# Patient Record
Sex: FEMALE | Race: WHITE | HISPANIC OR LATINO
[De-identification: names, ages, dates, MRNs, and addresses within clinical notes are randomized per-mention and may not be internally consistent; named-entity substitution may affect disease eponyms.]

---

## 2020-07-21 ENCOUNTER — APPOINTMENT (OUTPATIENT)
Dept: ENDOCRINOLOGY | Facility: CLINIC | Age: 70
End: 2020-07-21
Payer: COMMERCIAL

## 2020-07-21 VITALS
BODY MASS INDEX: 32.99 KG/M2 | HEIGHT: 65 IN | HEART RATE: 64 BPM | SYSTOLIC BLOOD PRESSURE: 150 MMHG | WEIGHT: 198 LBS | DIASTOLIC BLOOD PRESSURE: 89 MMHG

## 2020-07-21 PROBLEM — Z00.00 ENCOUNTER FOR PREVENTIVE HEALTH EXAMINATION: Status: ACTIVE | Noted: 2020-07-21

## 2020-07-21 PROCEDURE — 36415 COLL VENOUS BLD VENIPUNCTURE: CPT

## 2020-07-21 PROCEDURE — 99205 OFFICE O/P NEW HI 60 MIN: CPT | Mod: 25

## 2020-07-22 NOTE — ASSESSMENT
[FreeTextEntry1] : 1. Thyroid nodules. Asymptomatic .\par Surgically treated in 1996 in [facility]\par PE: thyroid with nodularities , order thyroid ultrasound\par \par 2.Hyperthyroidism treated with CARR  ablation in 2018 ( as per patient)\par Sending TFT, and call pat with results\par \par

## 2020-07-22 NOTE — ADDENDUM
[FreeTextEntry1] : I, Cooper Rose, acted solely as a scribe for Dr. Tucker Gross on this date. 07/21/2020.

## 2020-07-22 NOTE — HISTORY OF PRESENT ILLNESS
[FreeTextEntry1] : 71 y/o F pt, with Hx of thyroid surgery to remove a large nodule (in [facility] presents today to establish endocrine care with me.\par Other PMHx: HLD, HTN, obesity, varicose veins in LE\par PSHx: knee replacement (2011) \par FHx: DM (uncle), thyroid disease (sister) \par SHx: no tobacco/etOH use; works as an  and has 3 children\par Referral Note from MD "hx of thyroid nodules removed in 1996 and CARR ablation in 2018?, has elevation of TSH."\par \par Pt states she had thyroid surgery to remove a large nodule (in [facility] Pt notes after surgery she did not have any complaints and was not given any specific treatments. In 2018, she started to become symptomatic with c/o leg swelling, tiredness, palpitations, and weight loss of ~15 - 20lbs. She states she went for endo evaluation, who ordered Thyroid US and pt notes she received CARR ablation in 2018. Pt states she felt better after CARR ablation. Pt reports in 2019 a Thyroid US was done. \par \par 7/21/20 \par Today pt presents feeling well with c/o mild hair thinning.  \par Denies palpitations, heat intolerance, and weight loss. \par \par Current Medications: None (pt reports she has never been on any thyroid medications)

## 2020-07-22 NOTE — REVIEW OF SYSTEMS
[As Noted in HPI] : as noted in HPI [Negative] : Endocrine [Heat Intolerance] : no heat intolerance [Palpitations] : no palpitations [Recent Weight Loss (___ Lbs)] : no recent weight loss [de-identified] : mild hair thinning

## 2020-07-22 NOTE — PHYSICAL EXAM
[Alert] : alert [Normal Sclera/Conjunctiva] : normal sclera/conjunctiva [Normal Outer Ear/Nose] : the ears and nose were normal in appearance [Clear to Auscultation] : lungs were clear to auscultation bilaterally [Normal S1, S2] : normal S1 and S2 [Normal Rate] : heart rate was normal [Pedal Pulses Normal] : the pedal pulses are present [Normal Bowel Sounds] : normal bowel sounds [Spine Straight] : spine straight [Normal Gait] : normal gait [No Rash] : no rash [Oriented x3] : oriented to person, place, and time [Well Healed Scar] : well healed scar [No Tremors] : no tremors [Normal Reflexes] : deep tendon reflexes were 2+ and symmetric [de-identified] : L side with mild nodularities  [de-identified] : no hand tremors  [de-identified] : varicose veins in lower extremities

## 2020-07-22 NOTE — END OF VISIT
[FreeTextEntry3] : All medical record entries made by the Scribe were at my, Dr. Tukcer Gross, direction and personally dictated by me on 07/21/2020. I have reviewed the chart and agree that the record accurately reflects my personal performance of the history, physical exam, assessment and plan. I have also personally directed, reviewed and agreed with the chart.  [Time Spent: ___ minutes] : I have spent [unfilled] minutes of time on the encounter. [>50% of the face to face encounter time was spent on counseling and/or coordination of care for ___] : Greater than 50% of the face to face encounter time was spent on counseling and/or coordination of care for [unfilled]

## 2020-07-22 NOTE — REASON FOR VISIT
[Initial Evaluation] : an initial evaluation [Thyroid nodule/ MNG] : thyroid nodule/ MNG [Hyperthyroidism] : hyperthyroidism [FreeTextEntry2] : Dr. Savanah He

## 2020-07-22 NOTE — CONSULT LETTER
[Consult Letter:] : I had the pleasure of evaluating your patient, [unfilled]. [Dear  ___] : Dear  [unfilled], [Consult Closing:] : Thank you very much for allowing me to participate in the care of this patient.  If you have any questions, please do not hesitate to contact me. [FreeTextEntry3] : Renato Ceballos

## 2020-08-20 ENCOUNTER — APPOINTMENT (OUTPATIENT)
Dept: ENDOCRINOLOGY | Facility: CLINIC | Age: 70
End: 2020-08-20
Payer: COMMERCIAL

## 2020-08-20 VITALS
DIASTOLIC BLOOD PRESSURE: 87 MMHG | SYSTOLIC BLOOD PRESSURE: 137 MMHG | BODY MASS INDEX: 33.12 KG/M2 | HEART RATE: 70 BPM | WEIGHT: 199 LBS

## 2020-08-20 PROCEDURE — 99215 OFFICE O/P EST HI 40 MIN: CPT

## 2020-08-20 NOTE — REVIEW OF SYSTEMS
[Negative] : Heme/Lymph [Recent Weight Gain (___ Lbs)] : no recent weight gain [Recent Weight Loss (___ Lbs)] : no recent weight loss [Palpitations] : no palpitations [Heat Intolerance] : no heat intolerance

## 2020-08-20 NOTE — HISTORY OF PRESENT ILLNESS
[FreeTextEntry1] : 69 y/o F pt, with Hx of thyroid surgery to remove a large nodule (in [facility] and "received CARR ablation in 2018". \par Other PMHx: Osteoporosis, HLD, HTN, obesity, LE varicose veins\par Denies Hx of bone fx and kideny stones. \par PSHx: knee replacement (2011) \par FHx: DM (uncle), thyroid disease (sister) \par SHx: Non smoker. No EtOH use. Works as an  and has 3 children\par \par 7/21/20 \par Pt states she had thyroid surgery to remove a large nodule (in [facility] Pt notes after surgery she did not have any complaints and was not given any specific treatments. In 2018, she started to become symptomatic with c/o leg swelling, tiredness, palpitations, and weight loss of ~15 - 20lbs. She states she went for endo evaluation, who ordered Thyroid US and pt notes she received CARR ablation in 2018. Pt states she felt better after CARR ablation. Pt reports in 2019 a Thyroid US was done. \par Today pt presents feeling well with c/o mild hair thinning. Denies palpitations, heat intolerance, and weight loss. \par \par 08/20/2020\par Pt presents today for thyroid f/u, feeling well with no acute complaints. She was reportedly informed ~1 year ago that her calcium level was high. \par Denies Hx of bone fx and kidney stones. \par \par Current Medications: None (pt reports she has never been on any thyroid medications) \par \par Labs:\par - 8/7/20 Thyroid US compared to study on 8/14/17: Enlarged R lobe with 4 nodules: Nodule 1: Stable anterior R upper pole well-circumscribed echogenic nodule measuring 0.9 x 0.5 x 0.6 cm with no calcifications. Nodule 2: Stable R upper pole well-circumscribed heterogenous nodules measuring 1.4 x 1.2 x 1.1 cm with thick linear calcifications. Nodule 3: Stable R lower pole thick-walled cystic nodule measuring 0.7 x 0.6 x 0.7 cm. Nodule 4: R lower pole well-circumscribed heterogenous nodule measuring 2 x 1.7 x 1.7 cm, not previously demonstrated. Small L lobe with hypervascularity and heterogenous parenchyma containing 3 nodules. Nodule 1: Stable L upper pole well-circumscribed cystic soft tissue nodule measuring 0.8 x 0.8 x 0.7 cm with questionable foci of calcification. Nodule 2: Stable L midpole well-circumscribed heterogenous nodule measuring 0.5 x 0.5 x 0.5 cm with no calcification. Nodule 3: Posterior to the L midpole ovoid hypoechoic soft tissue nodule measuring 2.1 x 1.1 x 1.1 cm with mild peripheral vascularity but no significant change. Possibly an enlarged parathyroid gland/adenoma.  \par - 7/22/20: TSH 5.22 (H), TSH Rab neg, Free T4 1.3, Free T3 2.77, Ca 11.8 (H)

## 2020-08-20 NOTE — PHYSICAL EXAM
[Alert] : alert [Normal Outer Ear/Nose] : the ears and nose were normal in appearance [Normal Sclera/Conjunctiva] : normal sclera/conjunctiva [Well Healed Scar] : well healed scar [Clear to Auscultation] : lungs were clear to auscultation bilaterally [Normal S1, S2] : normal S1 and S2 [Pedal Pulses Normal] : the pedal pulses are present [Normal Rate] : heart rate was normal [Normal Bowel Sounds] : normal bowel sounds [Spine Straight] : spine straight [Normal Gait] : normal gait [No Rash] : no rash [Normal Reflexes] : deep tendon reflexes were 2+ and symmetric [Oriented x3] : oriented to person, place, and time [No Tremors] : no tremors [de-identified] : L side with mild nodularities  [de-identified] : varicose veins in lower extremities  [de-identified] : no hand tremors

## 2020-08-20 NOTE — ADDENDUM
[FreeTextEntry1] : I, Jerry Wright, acted solely as a scribe for Dr. Tucker Gross on this date. 08/20/2020.

## 2020-08-20 NOTE — END OF VISIT
[FreeTextEntry3] : All medical record entries made by the Scribe were at my, Dr. Tucker Gross, direction and personally dictated by me on 08/20/2020. I have reviewed the chart and agree that the record accurately reflects my personal performance of the history, physical exam, assessment and plan. I have also personally directed, reviewed and agreed with the chart.  [Time Spent: ___ minutes] : I have spent [unfilled] minutes of time on the encounter. [>50% of the face to face encounter time was spent on counseling and/or coordination of care for ___] : Greater than 50% of the face to face encounter time was spent on counseling and/or coordination of care for [unfilled]

## 2020-08-20 NOTE — ASSESSMENT
[FreeTextEntry1] : 71 y/o F pt with: \par \par 1. Thyroid surgery in 1996 for thyroid nodule removal and she received CARR ablation in 2018: \par Pt is clinically euthyroid. TSH Receptor ab is negative. \par Thyroid US in 8/2020 in comparison to previous study on 8/2017 reveals enlarged R lobe and small L lobe. Posterior to the L midpole ovoid hypoechoic soft tissue nodule measuring 2.1 cm  possibly an enlarged parathyroid adenoma. New R lower pole 2 cm nodule. Radiologist recommend thyroid US in a year. \par \par 2. Hypercalcemia:\par Ca 11.8 on 7/2020 (pt admits hypercalcemia dx for past 1-2 years). \par Thyroid and Parathyroid US on 8/2020 reveals posterior to the L midpole ovoid hypoechoic soft tissue nodule measuring 2.1 cm possibly an enlarged parathyroid adenoma.   \par These US findings and elevation of Ca indicate probable dx of primary hyperparathyroidism. \par Will send blood test and BMD to confirm dx; will call pt with results. \par \par Return in: 3 months

## 2020-08-21 LAB
24R-OH-CALCIDIOL SERPL-MCNC: 81.1 PG/ML
25(OH)D3 SERPL-MCNC: 21.2 NG/ML
ALBUMIN SERPL ELPH-MCNC: 4.4 G/DL
ALBUMIN SERPL ELPH-MCNC: 4.8 G/DL
ALP BLD-CCNC: 100 U/L
ALP BLD-CCNC: 98 U/L
ALT SERPL-CCNC: 14 U/L
ALT SERPL-CCNC: 18 U/L
ANION GAP SERPL CALC-SCNC: 11 MMOL/L
ANION GAP SERPL CALC-SCNC: 13 MMOL/L
AST SERPL-CCNC: 16 U/L
AST SERPL-CCNC: 20 U/L
BILIRUB SERPL-MCNC: 0.4 MG/DL
BILIRUB SERPL-MCNC: 0.7 MG/DL
BUN SERPL-MCNC: 20 MG/DL
BUN SERPL-MCNC: 21 MG/DL
CALCIUM SERPL-MCNC: 11.1 MG/DL
CALCIUM SERPL-MCNC: 11.1 MG/DL
CALCIUM SERPL-MCNC: 11.8 MG/DL
CHLORIDE SERPL-SCNC: 102 MMOL/L
CHLORIDE SERPL-SCNC: 103 MMOL/L
CO2 SERPL-SCNC: 26 MMOL/L
CO2 SERPL-SCNC: 28 MMOL/L
CREAT SERPL-MCNC: 0.76 MG/DL
CREAT SERPL-MCNC: 0.82 MG/DL
GLUCOSE SERPL-MCNC: 60 MG/DL
GLUCOSE SERPL-MCNC: 89 MG/DL
PARATHYROID HORMONE INTACT: 159 PG/ML
POTASSIUM SERPL-SCNC: 4.3 MMOL/L
POTASSIUM SERPL-SCNC: 4.9 MMOL/L
PROT SERPL-MCNC: 6.5 G/DL
PROT SERPL-MCNC: 7.2 G/DL
SODIUM SERPL-SCNC: 141 MMOL/L
SODIUM SERPL-SCNC: 142 MMOL/L
T3FREE SERPL-MCNC: 2.77 PG/ML
T4 FREE SERPL-MCNC: 1.3 NG/DL
TSH RECEPTOR AB: <1.1 IU/L
TSH SERPL-ACNC: 5.22 UIU/ML

## 2020-11-23 ENCOUNTER — APPOINTMENT (OUTPATIENT)
Dept: ENDOCRINOLOGY | Facility: CLINIC | Age: 70
End: 2020-11-23
Payer: COMMERCIAL

## 2020-11-23 VITALS
WEIGHT: 202 LBS | DIASTOLIC BLOOD PRESSURE: 88 MMHG | BODY MASS INDEX: 33.61 KG/M2 | SYSTOLIC BLOOD PRESSURE: 136 MMHG | HEART RATE: 71 BPM

## 2020-11-23 PROCEDURE — 99215 OFFICE O/P EST HI 40 MIN: CPT

## 2020-11-23 NOTE — REVIEW OF SYSTEMS
[Negative] : Endocrine [As Noted in HPI] : as noted in HPI [Recent Weight Gain (___ Lbs)] : recent weight gain: [unfilled] lbs

## 2020-11-24 NOTE — ADDENDUM
[FreeTextEntry1] : I, Cooper Rose, acted solely as a scribe for Dr. Tucker Gross on this date. 11/23/2020.

## 2020-11-24 NOTE — PHYSICAL EXAM
[Alert] : alert [Normal Sclera/Conjunctiva] : normal sclera/conjunctiva [Normal Outer Ear/Nose] : the ears and nose were normal in appearance [Well Healed Scar] : well healed scar [Clear to Auscultation] : lungs were clear to auscultation bilaterally [Normal S1, S2] : normal S1 and S2 [Normal Rate] : heart rate was normal [Pedal Pulses Normal] : the pedal pulses are present [Normal Bowel Sounds] : normal bowel sounds [Spine Straight] : spine straight [Normal Gait] : normal gait [No Rash] : no rash [Normal Reflexes] : deep tendon reflexes were 2+ and symmetric [No Tremors] : no tremors [Oriented x3] : oriented to person, place, and time [de-identified] : L side with mild nodularities  [de-identified] : varicose veins in lower extremities  [de-identified] : no hand tremors

## 2020-11-24 NOTE — HISTORY OF PRESENT ILLNESS
[FreeTextEntry1] : 71 y/o F pt, with Hx of thyroid surgery to remove a large nodule (in [facility] and "received CARR ablation in 2018". \par Other PMHx: Osteoporosis, HLD, HTN, obesity, LE varicose veins\par Denies Hx of bone fx and kidney stones. \par PSHx: knee replacement (2011) \par FHx: DM (uncle), thyroid disease (sister) \par SHx: Non smoker. No EtOH use. Works as an  and has 3 children\par \par 7/21/20 \par Pt states she had thyroid surgery to remove a large nodule (in [facility] Pt notes after surgery she did not have any complaints and was not given any specific treatments. In 2018, she started to become symptomatic with c/o leg swelling, tiredness, palpitations, and weight loss of ~15 - 20lbs. She states she went for endo evaluation, who ordered Thyroid US and pt notes she received CARR ablation in 2018. Pt states she felt better after CARR ablation. Pt reports in 2019 a Thyroid US was done. \par Today pt presents feeling well with c/o mild hair thinning. Denies palpitations, heat intolerance, and weight loss. \par \par 08/20/2020\par Pt presents today for thyroid f/u, feeling well with no acute complaints. She was reportedly informed ~1 year ago that her calcium level was high. \par Denies Hx of bone fx and kidney stones. \par \par 11/23/20 \par Her labs from initial visit: TSH 5.2, TSH rab negative, hypercalcemia, and increase iPTH (in 7/2020). Her thyroid US from 8/2020 shows enlarged R thyroid lobe and small L thyroid lobe. There is heterogenous nodule at R Lower Pole which measures 2 x 1.7 x 1.7 cm with no calcifications (this nodule is new). In the L lobe there is soft tissue nodule measures 2.1cm possible enlarged parathyroid gland.    \par \par Today pt presents for endocrine f/u, feeling well. Pt brought in BMD from 8/2019.\par Pt gained 3lbs since 8/2020. \par \par Current Medications: None (pt reports she has never been on any thyroid medications) \par \par Labs:\par - 8/20/20 iPTH 159, Ca 11.1, Vit D 25- OH 21.2, Vit D 1 - 25- OH 81.1, s. creat 0.76, \par - 8/7/20 Thyroid US compared to study on 8/14/17: Enlarged R lobe with 4 nodules: Nodule 1: Stable anterior R upper pole well-circumscribed echogenic nodule measuring 0.9 x 0.5 x 0.6 cm with no calcifications. Nodule 2: Stable R upper pole well-circumscribed heterogenous nodules measuring 1.4 x 1.2 x 1.1 cm with thick linear calcifications. Nodule 3: Stable R lower pole thick-walled cystic nodule measuring 0.7 x 0.6 x 0.7 cm. Nodule 4: R lower pole well-circumscribed heterogenous nodule measuring 2 x 1.7 x 1.7 cm, not previously demonstrated. Small L lobe with hypervascularity and heterogenous parenchyma containing 3 nodules. Nodule 1: Stable L upper pole well-circumscribed cystic soft tissue nodule measuring 0.8 x 0.8 x 0.7 cm with questionable foci of calcification. Nodule 2: Stable L midpole well-circumscribed heterogenous nodule measuring 0.5 x 0.5 x 0.5 cm with no calcification. Nodule 3: Posterior to the L midpole ovoid hypoechoic soft tissue nodule measuring 2.1 x 1.1 x 1.1 cm with mild peripheral vascularity but no significant change. Possibly an enlarged parathyroid gland/adenoma.  \par - 7/22/20: TSH 5.22 (H), TSH Rab neg, Free T4 1.3, Free T3 2.77, Ca 11.8 (H)\par - 8/29/19 BMD: Lumber Spine L1- L3 T- Score: -2.4, L Femoral Neck T- Score: -1.3, L Total Hip T- Score: -1.5, L Radius 1/3 Site T- Score: -0.6. \par Impression: Pt has low bone mass (osteopenia).

## 2020-11-24 NOTE — END OF VISIT
[FreeTextEntry3] : All medical record entries made by the Scribe were at my, Dr. Tucker Gross, direction and personally dictated by me on 11/23/2020. I have reviewed the chart and agree that the record accurately reflects my personal performance of the history, physical exam, assessment and plan. I have also personally directed, reviewed and agreed with the chart.  [Time Spent: ___ minutes] : I have spent [unfilled] minutes of time on the encounter. [>50% of the face to face encounter time was spent on counseling and/or coordination of care for ___] : Greater than 50% of the face to face encounter time was spent on counseling and/or coordination of care for [unfilled]

## 2020-12-10 ENCOUNTER — APPOINTMENT (OUTPATIENT)
Dept: OTOLARYNGOLOGY | Facility: CLINIC | Age: 70
End: 2020-12-10
Payer: COMMERCIAL

## 2020-12-10 VITALS
HEART RATE: 62 BPM | HEIGHT: 63 IN | SYSTOLIC BLOOD PRESSURE: 173 MMHG | TEMPERATURE: 97.7 F | BODY MASS INDEX: 35.79 KG/M2 | OXYGEN SATURATION: 99 % | DIASTOLIC BLOOD PRESSURE: 115 MMHG | WEIGHT: 202 LBS

## 2020-12-10 DIAGNOSIS — Z86.79 PERSONAL HISTORY OF OTHER DISEASES OF THE CIRCULATORY SYSTEM: ICD-10-CM

## 2020-12-10 DIAGNOSIS — Z78.9 OTHER SPECIFIED HEALTH STATUS: ICD-10-CM

## 2020-12-10 DIAGNOSIS — Z82.49 FAMILY HISTORY OF ISCHEMIC HEART DISEASE AND OTHER DISEASES OF THE CIRCULATORY SYSTEM: ICD-10-CM

## 2020-12-10 DIAGNOSIS — Z86.39 PERSONAL HISTORY OF OTHER ENDOCRINE, NUTRITIONAL AND METABOLIC DISEASE: ICD-10-CM

## 2020-12-10 DIAGNOSIS — E83.52 HYPERCALCEMIA: ICD-10-CM

## 2020-12-10 DIAGNOSIS — M81.0 AGE-RELATED OSTEOPOROSIS W/OUT CURRENT PATHOLOGICAL FRACTURE: ICD-10-CM

## 2020-12-10 PROCEDURE — 99205 OFFICE O/P NEW HI 60 MIN: CPT | Mod: 25

## 2020-12-10 PROCEDURE — 31575 DIAGNOSTIC LARYNGOSCOPY: CPT

## 2020-12-10 PROCEDURE — 76536 US EXAM OF HEAD AND NECK: CPT

## 2020-12-10 PROCEDURE — 99072 ADDL SUPL MATRL&STAF TM PHE: CPT

## 2020-12-10 RX ORDER — HYDROCHLOROTHIAZIDE 12.5 MG/1
12.5 TABLET ORAL
Refills: 0 | Status: ACTIVE | COMMUNITY

## 2020-12-10 RX ORDER — OLMESARTAN MEDOXOMIL 40 MG/1
40 TABLET, FILM COATED ORAL
Refills: 0 | Status: ACTIVE | COMMUNITY

## 2020-12-10 NOTE — PROCEDURE
[Image(s) Captured] : image(s) captured and filed [Unable to Cooperate with Mirror] : patient unable to cooperate with mirror [Gag Reflex] : gag reflex preventing mirror examination [Topical Lidocaine] : topical lidocaine [Oxymetazoline HCl] : oxymetazoline HCl [Flexible Endoscope] : examined with the flexible endoscope [Serial Number: ___] : Serial Number: [unfilled] [Hoarseness] : hoarseness not clearly evaluated by indirect laryngoscopy [FreeTextEntry3] : \par [facility]\par PARATHYROID ULTRASOUND REPORT\par \par NAME: PORTER ALEXANDRE .Libra..           MR# [id number]	              : 1950.....	         DATE: 12/10/2020\par \par HISTORY/ INDICATIONS: A 70-year-old female with well documented primary hyperparathyroidism, multiple thyroid nodules, hypercalcemia and osteoporosis to rule out parathyroid disease and actionable thyroid nodules.  \par \par COMPARISON: None\par \par PROCEDURE: Physician performed high-resolution ultrasound gray scale imaging and color Doppler supplementation of the thyroid gland and neck was obtained in the longitudinal and transverse planes using a 13 MHz linear transducer with image capture.  All measurements are in centimeters (longitudinal x AP x transverse).  \par \par FINDINGS: Overall the thyroid gland is heterogeneous in echotexture with mild thyromegaly involving the right lobe and a small residual left lobe.  The thyroid gland is multinodular with several calcified nodules.  The nodules and thyroid gland were not specifically imaged as patient had prior recent thyroid ultrasound and this study was focused on parathyroid localization.\par \par PARATHYROID GLANDS: Multi-gland parathyroid disease is highly suspected. There is a hypoechoic smoothly marginated oblong structure abutting the superior left thyroid lobe upper pole with a well-defined vascular pedicle and a hyperechoic line of separation that measures 1.95 x 0.90 x 1.07 CM.  A second a large parathyroid glands likely inferior to the right lower pole of the thyroid gland that is smoothly marginated, hypoechoic, with an echogenic line of separation and a polar vessel that measures 1.48 x 0.95 x 1.6 CM.  No other enlarged parathyroid glands are easily identified.\par \par IMPRESSION: A 70-year-old female with primary hyperparathyroidism and 2 hypoechoic structures located and the left upper and right lower thyroid poles suspicious for multi-gland parathyroid hyperplasia.\par \par RECOMMENDATIONS: A 4-D CT scan will be obtained for further confirmation prior to subtotal parathyroidectomy. \par \par Electronically signed by Prabhakar Corley MD on 12/10/2020, 11:00 AM\par \par [facility]: [address] [place] [address], [address], [place]  [address]\par [phone] (voice), [phone] (fax) [de-identified] : The nasal septum is minimally deviated to the left with a spur. There are no masses or polyps and the nasal mucosa and secretions are normal. The choanae and posterior nasopharynx are normal without masses or drainage. The Eustachian tube orifices appear patent. The pharynx, including the posterior and lateral pharyngeal walls, the vallecula and base of tongue are normal without ulcerations, lesions or masses. The hypopharynx including the pyriform sinuses open well without pooling of secretions, mucosal lesions or masses. The supraglottic larynx including the epiglottis, petiole, arytenoids, glossoepiglottic, aryepiglottic and pharyngoepiglottic folds are normal without mucosal lesions, ulcerations or masses. The glottis reveals normal false vocal folds. The true vocal folds are glistening white, tense and of equal length, without paralysis, having symmetric mobility on adduction and abduction. There are no mucosal lesions, nodules, cysts, erythroplasia or leukoplakia. The posterior cricoid area has healthy pink mucosa in the interarytenoid area and esophageal inlet. There is moderate thickening/edema of the interarytenoid mucosa suggestive of posterior laryngitis from laryngopharyngeal acid reflux disease. The trachea is clear without narrowing in the immediate subglottic region, without deviation or lesions. \par  [de-identified] : preoperative assessment

## 2020-12-10 NOTE — CONSULT LETTER
[Dear  ___] : Dear  [unfilled], [Consult Letter:] : I had the pleasure of evaluating your patient, [unfilled]. [Please see my note below.] : Please see my note below. [Consult Closing:] : Thank you very much for allowing me to participate in the care of this patient.  If you have any questions, please do not hesitate to contact me. [Sincerely,] : Sincerely, [FreeTextEntry3] : \par Prabhakar Corley M.D., FACS, ECNU\par Director [facility]\par [facility] [place] [facility]\par Certified in Thyroid/Parathyroid/Neck Ultrasound, ECNU/ AIUM\par \par , Department of Otolaryngology\par [employer] [employer] at [facility]\par

## 2020-12-10 NOTE — REASON FOR VISIT
[FreeTextEntry2] : a surgical consultation concerning hypercalcemia, primary hyperparathyroidism and osteoporosis. [FreeTextEntry1] : Referred by Tucker Gross MD Endocrinologist,  PCP is Arnoldo He MD [facility], [place]

## 2020-12-10 NOTE — HISTORY OF PRESENT ILLNESS
[de-identified] : Lynn is a 70-year-old female who reports that she was treated with CARR for management of hyperthyroidism in 2018 after a subtotal thyroidectomy performed in [place] in the [facility]. She was first noted to have hypercalcemia  and subsequent w/u is now consistent with primary hyperparathyroidism.  Her last serum calcium/PTH was 11.1/159. In August her calcium was as high as 11.8 mg/dl.  Her GFR is normal at 79. Vitamin D 25-OH total was low at 21.2.  She had subclinical hypothyroidism this summer with a TSH elevated to 5.22 with a normal free T4 at 1.3.  A thyroid ultrasound also this past summer revealed a small left lobe remnant and an enlarged right lobe measuring 5.6 x 2.2 x 1.9 cm.  There are three nodules in the right lobe.  There is an upper pole well circumscribed heterogeneous nodule measuring up to 9 mm without calcifications, another right upper pole well circumscribed heterogeneous nodule measuring up to 1.4 CM with linear calcifications in the right lower pole thick walled cystic nodule measuring 7 mm. A new right lower pole also prescribed heterogeneous nodule measures 2 cm without calcifications.  There is an 8 mm left upper pole cystic nodule measuring 8 mm.  A left mid pole well circumscribed heterogeneous nodule measures 5 mm calcifications. Posterior to the left mid lobe is an ovoid hypoechoic soft tissue nodule measuring 2.1 x 1.1 x 1.1 CM. Other than the new right lower pole 2 CM nodule that does not have any suspicious characteristics for malignancy exam was stable when compared to 2017. She is not currently taking any thyroid hormone replacement. Lynn denies recent shortness of breath, voice changes (although voice is always hoarse), dysphagia, anterior neck pain, neck pressure or mass. There is no family history of thyroid cancer. She denies any known radiation exposures in her youth.  She denies calcium, vitamin D supplements or past use of Lithium Carbonate.  However, she started HCTZ ~ 10 months ago.  She stated that her hypercalcemia was noted prior to taking HCTZ.  There is no family history of nephrolithiasis or renal disease.  There is no history of fragility bone fractures.  Other than intermittent constipation, polyuria/ polydipsia, she denies muscle weakness, fatigue, generalized bone aches, joint pain, depression, memory loss, brain fog, nausea, vomiting, abdominal pain, nephrolithiasis, peptic ulcer disease, pancreatitis or GERD. She denies fever, body aches, cough, cyanosis, chest burning, anosmia or recent known COVID exposures.  All family members at home are well.

## 2020-12-28 ENCOUNTER — OUTPATIENT (OUTPATIENT)
Dept: OUTPATIENT SERVICES | Facility: HOSPITAL | Age: 70
LOS: 1 days | End: 2020-12-28
Payer: COMMERCIAL

## 2020-12-28 PROCEDURE — A9500: CPT

## 2020-12-28 PROCEDURE — 78072 PARATHYRD PLANAR W/SPECT&CT: CPT

## 2020-12-28 PROCEDURE — A9512: CPT

## 2020-12-28 PROCEDURE — 78072 PARATHYRD PLANAR W/SPECT&CT: CPT | Mod: 26

## 2021-01-05 ENCOUNTER — APPOINTMENT (OUTPATIENT)
Dept: OTOLARYNGOLOGY | Facility: CLINIC | Age: 71
End: 2021-01-05
Payer: COMMERCIAL

## 2021-01-05 DIAGNOSIS — Z01.818 ENCOUNTER FOR OTHER PREPROCEDURAL EXAMINATION: ICD-10-CM

## 2021-01-05 PROCEDURE — ZZZZZ: CPT

## 2021-01-06 ENCOUNTER — OUTPATIENT (OUTPATIENT)
Dept: OUTPATIENT SERVICES | Facility: HOSPITAL | Age: 71
LOS: 1 days | End: 2021-01-06
Payer: COMMERCIAL

## 2021-01-06 ENCOUNTER — RESULT REVIEW (OUTPATIENT)
Age: 71
End: 2021-01-06

## 2021-01-06 DIAGNOSIS — D44.0 NEOPLASM OF UNCERTAIN BEHAVIOR OF THYROID GLAND: ICD-10-CM

## 2021-01-06 LAB — SARS-COV-2 N GENE NPH QL NAA+PROBE: NOT DETECTED

## 2021-01-06 PROCEDURE — 88321 CONSLTJ&REPRT SLD PREP ELSWR: CPT

## 2021-01-07 ENCOUNTER — TRANSCRIPTION ENCOUNTER (OUTPATIENT)
Age: 71
End: 2021-01-07

## 2021-01-07 VITALS
HEART RATE: 63 BPM | TEMPERATURE: 98 F | DIASTOLIC BLOOD PRESSURE: 80 MMHG | WEIGHT: 206.79 LBS | SYSTOLIC BLOOD PRESSURE: 162 MMHG | HEIGHT: 64 IN | RESPIRATION RATE: 17 BRPM | OXYGEN SATURATION: 97 %

## 2021-01-07 RX ORDER — NIFEDIPINE 30 MG/1
30 TABLET, EXTENDED RELEASE ORAL
Qty: 90 | Refills: 0 | Status: ACTIVE | COMMUNITY
Start: 2020-12-21

## 2021-01-08 ENCOUNTER — INPATIENT (INPATIENT)
Facility: HOSPITAL | Age: 71
LOS: 0 days | Discharge: HOME | DRG: 627 | End: 2021-01-09
Attending: SPECIALIST | Admitting: SPECIALIST
Payer: COMMERCIAL

## 2021-01-08 ENCOUNTER — RESULT REVIEW (OUTPATIENT)
Age: 71
End: 2021-01-08

## 2021-01-08 ENCOUNTER — APPOINTMENT (OUTPATIENT)
Dept: OTOLARYNGOLOGY | Facility: HOSPITAL | Age: 71
End: 2021-01-08

## 2021-01-08 DIAGNOSIS — Z41.9 ENCOUNTER FOR PROCEDURE FOR PURPOSES OTHER THAN REMEDYING HEALTH STATE, UNSPECIFIED: Chronic | ICD-10-CM

## 2021-01-08 LAB
ALBUMIN SERPL ELPH-MCNC: 4.1 G/DL — SIGNIFICANT CHANGE UP (ref 3.3–5)
ALBUMIN SERPL ELPH-MCNC: 4.1 G/DL — SIGNIFICANT CHANGE UP (ref 3.3–5)
ALP SERPL-CCNC: 103 U/L — SIGNIFICANT CHANGE UP (ref 40–120)
ALT FLD-CCNC: 14 U/L — SIGNIFICANT CHANGE UP (ref 10–45)
ANION GAP SERPL CALC-SCNC: 10 MMOL/L — SIGNIFICANT CHANGE UP (ref 5–17)
ANION GAP SERPL CALC-SCNC: 8 MMOL/L — SIGNIFICANT CHANGE UP (ref 5–17)
AST SERPL-CCNC: 21 U/L — SIGNIFICANT CHANGE UP (ref 10–40)
BILIRUB SERPL-MCNC: 0.6 MG/DL — SIGNIFICANT CHANGE UP (ref 0.2–1.2)
BUN SERPL-MCNC: 15 MG/DL — SIGNIFICANT CHANGE UP (ref 7–23)
BUN SERPL-MCNC: 16 MG/DL — SIGNIFICANT CHANGE UP (ref 7–23)
CA-I BLD-SCNC: 1.34 MMOL/L — HIGH (ref 1.12–1.3)
CALCIUM SERPL-MCNC: 10.1 MG/DL — SIGNIFICANT CHANGE UP (ref 8.4–10.5)
CALCIUM SERPL-MCNC: 10.1 MG/DL — SIGNIFICANT CHANGE UP (ref 8.4–10.5)
CHLORIDE SERPL-SCNC: 103 MMOL/L — SIGNIFICANT CHANGE UP (ref 96–108)
CHLORIDE SERPL-SCNC: 106 MMOL/L — SIGNIFICANT CHANGE UP (ref 96–108)
CO2 SERPL-SCNC: 26 MMOL/L — SIGNIFICANT CHANGE UP (ref 22–31)
CO2 SERPL-SCNC: 28 MMOL/L — SIGNIFICANT CHANGE UP (ref 22–31)
CREAT SERPL-MCNC: 0.61 MG/DL — SIGNIFICANT CHANGE UP (ref 0.5–1.3)
CREAT SERPL-MCNC: 0.73 MG/DL — SIGNIFICANT CHANGE UP (ref 0.5–1.3)
GLUCOSE SERPL-MCNC: 102 MG/DL — HIGH (ref 70–99)
GLUCOSE SERPL-MCNC: 154 MG/DL — HIGH (ref 70–99)
HCT VFR BLD CALC: 44.3 % — SIGNIFICANT CHANGE UP (ref 34.5–45)
HGB BLD-MCNC: 14.9 G/DL — SIGNIFICANT CHANGE UP (ref 11.5–15.5)
MAGNESIUM SERPL-MCNC: 1.8 MG/DL — SIGNIFICANT CHANGE UP (ref 1.6–2.6)
MAGNESIUM SERPL-MCNC: 1.8 MG/DL — SIGNIFICANT CHANGE UP (ref 1.6–2.6)
MCHC RBC-ENTMCNC: 26.1 PG — LOW (ref 27–34)
MCHC RBC-ENTMCNC: 33.6 GM/DL — SIGNIFICANT CHANGE UP (ref 32–36)
MCV RBC AUTO: 77.7 FL — LOW (ref 80–100)
NON-GYNECOLOGICAL CYTOLOGY STUDY: SIGNIFICANT CHANGE UP
NRBC # BLD: 0 /100 WBCS — SIGNIFICANT CHANGE UP (ref 0–0)
PHOSPHATE SERPL-MCNC: 2.8 MG/DL — SIGNIFICANT CHANGE UP (ref 2.5–4.5)
PHOSPHATE SERPL-MCNC: 3.6 MG/DL — SIGNIFICANT CHANGE UP (ref 2.5–4.5)
PLATELET # BLD AUTO: 153 K/UL — SIGNIFICANT CHANGE UP (ref 150–400)
POTASSIUM SERPL-MCNC: 4.4 MMOL/L — SIGNIFICANT CHANGE UP (ref 3.5–5.3)
POTASSIUM SERPL-MCNC: 4.8 MMOL/L — SIGNIFICANT CHANGE UP (ref 3.5–5.3)
POTASSIUM SERPL-SCNC: 4.4 MMOL/L — SIGNIFICANT CHANGE UP (ref 3.5–5.3)
POTASSIUM SERPL-SCNC: 4.8 MMOL/L — SIGNIFICANT CHANGE UP (ref 3.5–5.3)
PROT SERPL-MCNC: 7.1 G/DL — SIGNIFICANT CHANGE UP (ref 6–8.3)
PTH-INTACT IO % DIF SERPL: 149.4 PG/ML — HIGH (ref 8.5–72.5)
PTH-INTACT IO % DIF SERPL: 206.8 PG/ML — HIGH (ref 8.5–72.5)
PTH-INTACT IO % DIF SERPL: 22 PG/ML — SIGNIFICANT CHANGE UP (ref 8.5–72.5)
PTH-INTACT IO % DIF SERPL: 27.9 PG/ML — SIGNIFICANT CHANGE UP (ref 8.5–72.5)
PTH-INTACT IO % DIF SERPL: 61.8 PG/ML — SIGNIFICANT CHANGE UP (ref 8.5–72.5)
RBC # BLD: 5.7 M/UL — HIGH (ref 3.8–5.2)
RBC # FLD: 14.4 % — SIGNIFICANT CHANGE UP (ref 10.3–14.5)
SODIUM SERPL-SCNC: 140 MMOL/L — SIGNIFICANT CHANGE UP (ref 135–145)
SODIUM SERPL-SCNC: 141 MMOL/L — SIGNIFICANT CHANGE UP (ref 135–145)
WBC # BLD: 11.61 K/UL — HIGH (ref 3.8–10.5)
WBC # FLD AUTO: 11.61 K/UL — HIGH (ref 3.8–10.5)

## 2021-01-08 PROCEDURE — 88331 PATH CONSLTJ SURG 1 BLK 1SPC: CPT | Mod: 26

## 2021-01-08 PROCEDURE — 60502 RE-EXPLORE PARATHYROIDS: CPT | Mod: GC

## 2021-01-08 PROCEDURE — 15004 WOUND PREP F/N/HF/G: CPT

## 2021-01-08 PROCEDURE — 88305 TISSUE EXAM BY PATHOLOGIST: CPT | Mod: 26

## 2021-01-08 RX ORDER — HEPARIN SODIUM 5000 [USP'U]/ML
7500 INJECTION INTRAVENOUS; SUBCUTANEOUS EVERY 8 HOURS
Refills: 0 | Status: DISCONTINUED | OUTPATIENT
Start: 2021-01-08 | End: 2021-01-09

## 2021-01-08 RX ORDER — ACETAMINOPHEN 500 MG
650 TABLET ORAL EVERY 6 HOURS
Refills: 0 | Status: DISCONTINUED | OUTPATIENT
Start: 2021-01-08 | End: 2021-01-09

## 2021-01-08 RX ORDER — SODIUM CHLORIDE 9 MG/ML
1000 INJECTION INTRAMUSCULAR; INTRAVENOUS; SUBCUTANEOUS
Refills: 0 | Status: DISCONTINUED | OUTPATIENT
Start: 2021-01-08 | End: 2021-01-08

## 2021-01-08 RX ORDER — LOSARTAN POTASSIUM 100 MG/1
1 TABLET, FILM COATED ORAL
Qty: 0 | Refills: 0 | DISCHARGE

## 2021-01-08 RX ORDER — HEPARIN SODIUM 5000 [USP'U]/ML
5000 INJECTION INTRAVENOUS; SUBCUTANEOUS EVERY 8 HOURS
Refills: 0 | Status: DISCONTINUED | OUTPATIENT
Start: 2021-01-08 | End: 2021-01-08

## 2021-01-08 RX ORDER — HYDROMORPHONE HYDROCHLORIDE 2 MG/ML
0.5 INJECTION INTRAMUSCULAR; INTRAVENOUS; SUBCUTANEOUS
Refills: 0 | Status: DISCONTINUED | OUTPATIENT
Start: 2021-01-08 | End: 2021-01-09

## 2021-01-08 RX ADMIN — Medication 650 MILLIGRAM(S): at 16:39

## 2021-01-08 RX ADMIN — HEPARIN SODIUM 7500 UNIT(S): 5000 INJECTION INTRAVENOUS; SUBCUTANEOUS at 21:46

## 2021-01-08 RX ADMIN — Medication 650 MILLIGRAM(S): at 21:45

## 2021-01-08 NOTE — PACU DISCHARGE NOTE - COMMENTS
pt aao x3.  VSS.  gauze and tape to neck c/d/i; CHRISTIAN x 1 to ss.  reports slight neck discomfort; denies need for pain meds at this time.  report given to RN on 9 uris.  pt to go to [facility] via stretcher with transport

## 2021-01-08 NOTE — BRIEF OPERATIVE NOTE - OPERATION/FINDINGS
Under GA, supine position, lower neck collar incision was made by revision of the previous scar, dense adhesions noted, dissection continued down to the Platysma muscle that was opened,  flaps raised, we entered between the SCM and the strap muscles avoiding the  midline because of dense adhesions from previous surgery, left thyroid remanent was noted, pushed medially, then 2x1 left upper parathyroid adenoma was found, left RLN and vagus identified, +ve nerve stimulation, blood supply clipped, cut and tied, [facility] was sent at the same time and after 10 minutes, a frozen section of LL parathyroid gland was obtained, hemostasis achieved, drain inserted, fascia closed by 5/0 Vicryl, subcutaneous tissue was closed by 4/0 Vicryl, then subcuticular prolene 5/0 sutures for the skin, dressing

## 2021-01-08 NOTE — PROGRESS NOTE ADULT - SUBJECTIVE AND OBJECTIVE BOX
POST-OPERATIVE NOTE    Procedure: revisional neck exploration, left parathyroidectomy for removal of adenoma     Diagnosis/Indication: parathyroid adenoma     Surgeon: MD Barney    S: Pt has no complains of anterior neck, voice is mildly hoarse but she relates it to the throat being dry. Reported posterior neck pain - muscular tension type of pain. Denies CP, SOB, CARTAGENA, calf tenderness. Pain controlled with medication.    O:  T(C): 35.7 (01-08-21 @ 10:35), Max: 35.7 (01-08-21 @ 10:35)  T(F): 96.3 (01-08-21 @ 10:35), Max: 96.3 (01-08-21 @ 10:35)  HR: 64 (01-08-21 @ 11:15) (63 - 65)  BP: 147/90 (01-08-21 @ 11:15) (140/85 - 153/81)  RR: 17 (01-08-21 @ 11:15) (15 - 20)  SpO2: 100% (01-08-21 @ 11:15) (98% - 100%)  Wt(kg): --    01-08    140  |  106  |  15  ----------------------------<  102<H>  4.4   |  26  |  0.61    Ca    10.1      08 Jan 2021 07:37  Phos  2.8     01-08  Mg     1.8     01-08    TPro  7.1  /  Alb  4.1  /  TBili  0.6  /  DBili  x   /  AST  21  /  ALT  14  /  AlkPhos  103  01-08      Gen: NAD, resting comfortably in bed  C/V: NSR  face: symmetric facial movement  Pulm: Nonlabored breathing, no respiratory distress  Neck: no hematoma or edema, no erythema  Extrem: WWP, no calf edema, SCDs in place

## 2021-01-09 VITALS
HEART RATE: 59 BPM | DIASTOLIC BLOOD PRESSURE: 94 MMHG | RESPIRATION RATE: 17 BRPM | OXYGEN SATURATION: 98 % | SYSTOLIC BLOOD PRESSURE: 160 MMHG | TEMPERATURE: 98 F

## 2021-01-09 LAB
ALBUMIN SERPL ELPH-MCNC: 3.7 G/DL — SIGNIFICANT CHANGE UP (ref 3.3–5)
ALP SERPL-CCNC: 82 U/L — SIGNIFICANT CHANGE UP (ref 40–120)
ALT FLD-CCNC: 11 U/L — SIGNIFICANT CHANGE UP (ref 10–45)
ANION GAP SERPL CALC-SCNC: 11 MMOL/L — SIGNIFICANT CHANGE UP (ref 5–17)
AST SERPL-CCNC: 14 U/L — SIGNIFICANT CHANGE UP (ref 10–40)
BILIRUB SERPL-MCNC: 0.5 MG/DL — SIGNIFICANT CHANGE UP (ref 0.2–1.2)
BUN SERPL-MCNC: 17 MG/DL — SIGNIFICANT CHANGE UP (ref 7–23)
CALCIUM SERPL-MCNC: 8.8 MG/DL — SIGNIFICANT CHANGE UP (ref 8.4–10.5)
CHLORIDE SERPL-SCNC: 102 MMOL/L — SIGNIFICANT CHANGE UP (ref 96–108)
CO2 SERPL-SCNC: 25 MMOL/L — SIGNIFICANT CHANGE UP (ref 22–31)
CREAT SERPL-MCNC: 0.79 MG/DL — SIGNIFICANT CHANGE UP (ref 0.5–1.3)
GLUCOSE SERPL-MCNC: 101 MG/DL — HIGH (ref 70–99)
HCT VFR BLD CALC: 39.2 % — SIGNIFICANT CHANGE UP (ref 34.5–45)
HGB BLD-MCNC: 13.2 G/DL — SIGNIFICANT CHANGE UP (ref 11.5–15.5)
MAGNESIUM SERPL-MCNC: 1.7 MG/DL — SIGNIFICANT CHANGE UP (ref 1.6–2.6)
MCHC RBC-ENTMCNC: 26.6 PG — LOW (ref 27–34)
MCHC RBC-ENTMCNC: 33.7 GM/DL — SIGNIFICANT CHANGE UP (ref 32–36)
MCV RBC AUTO: 79 FL — LOW (ref 80–100)
NRBC # BLD: 0 /100 WBCS — SIGNIFICANT CHANGE UP (ref 0–0)
PHOSPHATE SERPL-MCNC: 3.5 MG/DL — SIGNIFICANT CHANGE UP (ref 2.5–4.5)
PLATELET # BLD AUTO: 139 K/UL — LOW (ref 150–400)
POTASSIUM SERPL-MCNC: 3.9 MMOL/L — SIGNIFICANT CHANGE UP (ref 3.5–5.3)
POTASSIUM SERPL-SCNC: 3.9 MMOL/L — SIGNIFICANT CHANGE UP (ref 3.5–5.3)
PROT SERPL-MCNC: 6.3 G/DL — SIGNIFICANT CHANGE UP (ref 6–8.3)
PTH-INTACT IO % DIF SERPL: 51 PG/ML — SIGNIFICANT CHANGE UP (ref 8.5–72.5)
RBC # BLD: 4.96 M/UL — SIGNIFICANT CHANGE UP (ref 3.8–5.2)
RBC # FLD: 14.5 % — SIGNIFICANT CHANGE UP (ref 10.3–14.5)
SODIUM SERPL-SCNC: 138 MMOL/L — SIGNIFICANT CHANGE UP (ref 135–145)
WBC # BLD: 8.56 K/UL — SIGNIFICANT CHANGE UP (ref 3.8–10.5)
WBC # FLD AUTO: 8.56 K/UL — SIGNIFICANT CHANGE UP (ref 3.8–10.5)

## 2021-01-09 PROCEDURE — 80048 BASIC METABOLIC PNL TOTAL CA: CPT

## 2021-01-09 PROCEDURE — 83735 ASSAY OF MAGNESIUM: CPT

## 2021-01-09 PROCEDURE — 84100 ASSAY OF PHOSPHORUS: CPT

## 2021-01-09 PROCEDURE — 85027 COMPLETE CBC AUTOMATED: CPT

## 2021-01-09 PROCEDURE — 82330 ASSAY OF CALCIUM: CPT

## 2021-01-09 PROCEDURE — 36415 COLL VENOUS BLD VENIPUNCTURE: CPT

## 2021-01-09 PROCEDURE — C1889: CPT

## 2021-01-09 PROCEDURE — 88331 PATH CONSLTJ SURG 1 BLK 1SPC: CPT

## 2021-01-09 PROCEDURE — 83970 ASSAY OF PARATHORMONE: CPT

## 2021-01-09 PROCEDURE — 80053 COMPREHEN METABOLIC PANEL: CPT

## 2021-01-09 PROCEDURE — 88305 TISSUE EXAM BY PATHOLOGIST: CPT

## 2021-01-09 PROCEDURE — 82040 ASSAY OF SERUM ALBUMIN: CPT

## 2021-01-09 RX ADMIN — Medication 650 MILLIGRAM(S): at 05:26

## 2021-01-09 RX ADMIN — HEPARIN SODIUM 7500 UNIT(S): 5000 INJECTION INTRAVENOUS; SUBCUTANEOUS at 05:26

## 2021-01-09 NOTE — PROGRESS NOTE ADULT - ASSESSMENT
69 y/o F Malian speaking PMH HTN, HLD, obesity, LE varcicose vein, osteoprosis, PSH: left subtotal thyroidectomy 1996 in  knee replacement 2011 01/08/2020 s/p reviosional neck exploeation, left paratthyroidectomy for removal of adenoma     Plan:  - admit to observation   - labs at 14:15  - soft diet  - keep drain till tomorrow  - Warm compresses for posterior neck discomfort.   - care by team 1  
71 y/o F Afghan speaking PMH HTN, HLD, obesity, LE varcicose vein, osteoprosis, PSH: [place] [place] s/p revisional neck exploration, left parathyroidectomy for removal of adenoma on 1/8    - 23 hr obs  - Pain/nausea control prn  - Soft diet  - HSQ/SCDs  - keep drain till 1/9  - AM labs   Discahrge home today

## 2021-01-09 NOTE — PROGRESS NOTE ADULT - SUBJECTIVE AND OBJECTIVE BOX
SUBJECTIVE: Patient seen and examined bedside by chief resident. Patient reports       heparin   Injectable 7500 Unit(s) SubCutaneous every 8 hours      Vital Signs Last 24 Hrs  T(C): 36.6 (09 Jan 2021 05:20), Max: 36.7 (08 Jan 2021 16:33)  T(F): 97.9 (09 Jan 2021 05:20), Max: 98 (08 Jan 2021 16:33)  HR: 62 (09 Jan 2021 05:20) (62 - 90)  BP: 154/86 (09 Jan 2021 05:20) (107/66 - 157/91)  BP(mean): 119 (08 Jan 2021 12:30) (105 - 119)  RR: 16 (09 Jan 2021 05:20) (15 - 20)  SpO2: 97% (09 Jan 2021 05:20) (96% - 100%)  I&O's Detail    08 Jan 2021 07:01  -  09 Jan 2021 06:39  --------------------------------------------------------  IN:    Oral Fluid: 180 mL    sodium chloride 0.9%: 500 mL  Total IN: 680 mL    OUT:    Bulb (mL): 35 mL    Voided (mL): 1700 mL  Total OUT: 1735 mL    Total NET: -1055 mL          PHYSICAL EXAMINATION   General: NAD  NEURO:  follows commands.   PULM: nonlabored breathing, no respiratory distress  ABD: soft, nondistended, appropriately tender, no rebound, no guarding, no tympany. Incisions CDI and without hematoma or erythema    EXTREM: WWP, no edema, no calf tenderness  VASC: No cyanosis,  no pallor.   PSYCH: Appropriate affect, answers questions appropriately      LABS:                        14.9   11.61 )-----------( 153      ( 08 Jan 2021 15:26 )             44.3     01-08    141  |  103  |  16  ----------------------------<  154<H>  4.8   |  28  |  0.73    Ca    10.1      08 Jan 2021 15:25  Phos  3.6     01-08  Mg     1.8     01-08    TPro  x   /  Alb  4.1  /  TBili  x   /  DBili  x   /  AST  x   /  ALT  x   /  AlkPhos  x   01-08          SUBJECTIVE: Patient seen and examined bedside by chief resident. Patient reports feeling well, no nausea or vomit, tolerating the diet, no numbness, tingling, perioral numbness, some sore throat, Patient feels fine and ready.       heparin   Injectable 7500 Unit(s) SubCutaneous every 8 hours      Vital Signs Last 24 Hrs  T(C): 36.6 (09 Jan 2021 05:20), Max: 36.7 (08 Jan 2021 16:33)  T(F): 97.9 (09 Jan 2021 05:20), Max: 98 (08 Jan 2021 16:33)  HR: 62 (09 Jan 2021 05:20) (62 - 90)  BP: 154/86 (09 Jan 2021 05:20) (107/66 - 157/91)  BP(mean): 119 (08 Jan 2021 12:30) (105 - 119)  RR: 16 (09 Jan 2021 05:20) (15 - 20)  SpO2: 97% (09 Jan 2021 05:20) (96% - 100%)  I&O's Detail    08 Jan 2021 07:01  -  09 Jan 2021 06:39  --------------------------------------------------------  IN:    Oral Fluid: 180 mL    sodium chloride 0.9%: 500 mL  Total IN: 680 mL    OUT:    Bulb (mL): 35 mL    Voided (mL): 1700 mL  Total OUT: 1735 mL    Total NET: -1055 mL          PHYSICAL EXAMINATION   General: NAD  NEURO:  follows commands.   Neck: No edema, hematomas, symmetrical face movement  PULM: nonlabored breathing, no respiratory distress  EXTREM: WWP, no edema, no calf tenderness  VASC: No cyanosis,  no pallor.   PSYCH: Appropriate affect, answers questions appropriately      LABS:                        14.9   11.61 )-----------( 153      ( 08 Jan 2021 15:26 )             44.3     01-08    141  |  103  |  16  ----------------------------<  154<H>  4.8   |  28  |  0.73    Ca    10.1      08 Jan 2021 15:25  Phos  3.6     01-08  Mg     1.8     01-08    TPro  x   /  Alb  4.1  /  TBili  x   /  DBili  x   /  AST  x   /  ALT  x   /  AlkPhos  x   01-08

## 2021-01-11 PROBLEM — E07.9 DISORDER OF THYROID, UNSPECIFIED: Chronic | Status: ACTIVE | Noted: 2021-01-07

## 2021-01-11 PROBLEM — I10 ESSENTIAL (PRIMARY) HYPERTENSION: Chronic | Status: ACTIVE | Noted: 2021-01-07

## 2021-01-11 PROBLEM — E78.5 HYPERLIPIDEMIA, UNSPECIFIED: Chronic | Status: ACTIVE | Noted: 2021-01-07

## 2021-01-12 ENCOUNTER — APPOINTMENT (OUTPATIENT)
Dept: OTOLARYNGOLOGY | Facility: CLINIC | Age: 71
End: 2021-01-12
Payer: COMMERCIAL

## 2021-01-12 VITALS
DIASTOLIC BLOOD PRESSURE: 107 MMHG | HEART RATE: 67 BPM | RESPIRATION RATE: 17 BRPM | SYSTOLIC BLOOD PRESSURE: 184 MMHG | OXYGEN SATURATION: 99 % | TEMPERATURE: 98.7 F

## 2021-01-12 VITALS — DIASTOLIC BLOOD PRESSURE: 103 MMHG | SYSTOLIC BLOOD PRESSURE: 190 MMHG

## 2021-01-12 DIAGNOSIS — R49.9 UNSPECIFIED VOICE AND RESONANCE DISORDER: ICD-10-CM

## 2021-01-12 DIAGNOSIS — Z86.03 PERSONAL HISTORY OF NEOPLASM OF UNCERTAIN BEHAVIOR: ICD-10-CM

## 2021-01-12 PROCEDURE — 31575 DIAGNOSTIC LARYNGOSCOPY: CPT | Mod: 58

## 2021-01-12 PROCEDURE — 99024 POSTOP FOLLOW-UP VISIT: CPT

## 2021-01-12 RX ORDER — AZITHROMYCIN 500 MG/1
500 TABLET, FILM COATED ORAL DAILY
Qty: 1 | Refills: 0 | Status: COMPLETED | COMMUNITY
Start: 2021-01-07 | End: 2021-01-12

## 2021-01-12 RX ORDER — ACETAMINOPHEN AND CODEINE 300; 30 MG/1; MG/1
300-30 TABLET ORAL
Qty: 12 | Refills: 0 | Status: COMPLETED | COMMUNITY
Start: 2021-01-07 | End: 2021-01-12

## 2021-01-12 NOTE — HISTORY OF PRESENT ILLNESS
[de-identified] : Lynn is a 70-year-old female who reports that she was treated with CARR for management of hyperthyroidism in 2018 after a subtotal thyroide[place] She was first noted to have hypercalcemia  and subsequent w/u is now consistent with primary hyperparathyroidism.  Her last serum calcium/PTH was 11.1/159. In August her calcium was as high as 11.8 mg/dl.  Her GFR is normal at 79. Vitamin D 25-OH total was low at 21.2.  She had subclinical hypothyroidism this summer with a TSH elevated to 5.22 with a normal free T4 at 1.3.  A thyroid ultrasound also this past summer revealed a small left lobe remnant and an enlarged right lobe measuring 5.6 x 2.2 x 1.9 cm.  There are three nodules in the right lobe.  There is an upper pole well circumscribed heterogeneous nodule measuring up to 9 mm without calcifications, another right upper pole well circumscribed heterogeneous nodule measuring up to 1.4 CM with linear calcifications in the right lower pole thick walled cystic nodule measuring 7 mm. A new right lower pole also prescribed heterogeneous nodule measures 2 cm without calcifications.  There is an 8 mm left upper pole cystic nodule measuring 8 mm.  A left mid pole well circumscribed heterogeneous nodule measures 5 mm calcifications. Posterior to the left mid lobe is an ovoid hypoechoic soft tissue nodule measuring 2.1 x 1.1 x 1.1 CM. Other than the new right lower pole 2 CM nodule that does not have any suspicious characteristics for malignancy exam was stable when compared to 2017. She is not currently taking any thyroid hormone replacement. Lynn denies recent shortness of breath, voice changes (although voice is always hoarse), dysphagia, anterior neck pain, neck pressure or mass. There is no family history of thyroid cancer. She denies any known radiation exposures in her youth.  She denies calcium, vitamin D supplements or past use of Lithium Carbonate.  However, she started HCTZ ~ 10 months ago.  She stated that her hypercalcemia was noted prior to taking HCTZ.  There is no family history of nephrolithiasis or renal disease.  There is no history of fragility bone fractures.  Other than intermittent constipation, polyuria/ polydipsia, she denies muscle weakness, fatigue, generalized bone aches, joint pain, depression, memory loss, brain fog, nausea, vomiting, abdominal pain, nephrolithiasis, peptic ulcer disease, pancreatitis or GERD. She denies fever, body aches, cough, cyanosis, chest burning, anosmia or recent known COVID exposures.  All family members at home are well. \par  [FreeTextEntry1] : Lynn returns for a first post op visit after an uncomplicated parathyroidectomy on 01/08/2021.  She had a left superior 2.5 cm parathyroid adenoma and a normal appearing left inferior PG.  IOPTH drop from 206 to 61.8 with further decline was felt satisfactory.  Her hospital discharge calcium was normal.  She denies paresthesias and is taking 1 Citracal BID.  Voice is minimally hoarse. Surgical pathology is pending.  Preop a right 2 cm lower thyroid lobe cystic nodule was biopsied and reported as [place] cat III but on molecular testing low probability of malignancy (low thyroid cell content) reduces reliability of the mutation analysis. This will  need to be observed with serial US imaging.  If further concerns will need repeat FNAB.

## 2021-01-12 NOTE — PROCEDURE
[Image(s) Captured] : image(s) captured and filed [Unable to Cooperate with Mirror] : patient unable to cooperate with mirror [Gag Reflex] : gag reflex preventing mirror examination [Hoarseness] : hoarseness not clearly evaluated by indirect laryngoscopy [Topical Lidocaine] : topical lidocaine [Oxymetazoline HCl] : oxymetazoline HCl [Flexible Endoscope] : examined with the flexible endoscope [Serial Number: ___] : Serial Number: [unfilled] [de-identified] : The nasal septum is minimally deviated to the left with a spur. There are no masses or polyps and the nasal mucosa and secretions are normal. The choanae and posterior nasopharynx are normal without masses or drainage. The Eustachian tube orifices appear patent. The pharynx, including the posterior and lateral pharyngeal walls, the vallecula and base of tongue are normal without ulcerations, lesions or masses. The hypopharynx including the pyriform sinuses open well without pooling of secretions, mucosal lesions or masses. The supraglottic larynx including the epiglottis, petiole, arytenoids, glossoepiglottic, aryepiglottic and pharyngoepiglottic folds are normal without mucosal lesions, ulcerations or masses. The glottis reveals normal false vocal folds. The true vocal folds are hyperemic but tense and of equal length, without paralysis, having symmetric mobility on adduction and abduction. There are no mucosal lesions, nodules, cysts, erythroplasia or leukoplakia. The posterior cricoid area has healthy pink mucosa in the interarytenoid area and esophageal inlet. There is moderate thickening/edema of the interarytenoid mucosa suggestive of posterior laryngitis from laryngopharyngeal acid reflux disease. The trachea is clear without narrowing in the immediate subglottic region, without deviation or lesions. \par  [de-identified] : post operative assessment

## 2021-01-12 NOTE — REASON FOR VISIT
[FreeTextEntry2] : a first post op visit after parathyroidectomy [FreeTextEntry1] : Referred by Tucker Gross MD Endocrinologist,  PCP is Arnoldo He MD [facility], [place]

## 2021-01-12 NOTE — CONSULT LETTER
[Dear  ___] : Dear  [unfilled], [Consult Letter:] : I had the pleasure of evaluating your patient, [unfilled]. [Please see my note below.] : Please see my note below. [Consult Closing:] : Thank you very much for allowing me to participate in the care of this patient.  If you have any questions, please do not hesitate to contact me. [Sincerely,] : Sincerely, [FreeTextEntry3] : \par Prabhakar Corley M.D., FACS, ECNU\par Director Center for Thyroid & Parathyroid Surgery\par [facility]\par Certified in Thyroid/Parathyroid/Neck Ultrasound, ECNU/ AIUM\par \par , Department of Otolaryngology\par [facility] at [facility]\par

## 2021-01-12 NOTE — PHYSICAL EXAM
[Normal] : no mass and no adenopathy [de-identified] : The neck is flat without seroma or hematoma. The subcuticular suture was removed. The voice is raspy.  A [place] sign was not present.

## 2021-01-13 LAB — SURGICAL PATHOLOGY STUDY: SIGNIFICANT CHANGE UP

## 2021-01-14 DIAGNOSIS — E78.5 HYPERLIPIDEMIA, UNSPECIFIED: ICD-10-CM

## 2021-01-14 DIAGNOSIS — D35.1 BENIGN NEOPLASM OF PARATHYROID GLAND: ICD-10-CM

## 2021-01-14 DIAGNOSIS — E66.9 OBESITY, UNSPECIFIED: ICD-10-CM

## 2021-01-14 DIAGNOSIS — I10 ESSENTIAL (PRIMARY) HYPERTENSION: ICD-10-CM

## 2021-01-14 DIAGNOSIS — E21.0 PRIMARY HYPERPARATHYROIDISM: ICD-10-CM

## 2021-01-14 DIAGNOSIS — M81.0 AGE-RELATED OSTEOPOROSIS WITHOUT CURRENT PATHOLOGICAL FRACTURE: ICD-10-CM

## 2021-01-14 DIAGNOSIS — Z96.652 PRESENCE OF LEFT ARTIFICIAL KNEE JOINT: ICD-10-CM

## 2021-01-14 LAB
25(OH)D3 SERPL-MCNC: 22 NG/ML
ALBUMIN SERPL ELPH-MCNC: 4.2 G/DL
ALP BLD-CCNC: 111 U/L
ALT SERPL-CCNC: 34 U/L
ANION GAP SERPL CALC-SCNC: 14 MMOL/L
AST SERPL-CCNC: 26 U/L
BILIRUB SERPL-MCNC: 0.4 MG/DL
BUN SERPL-MCNC: 15 MG/DL
CALCIUM SERPL-MCNC: 9.4 MG/DL
CALCIUM SERPL-MCNC: 9.4 MG/DL
CHLORIDE SERPL-SCNC: 103 MMOL/L
CO2 SERPL-SCNC: 24 MMOL/L
CREAT SERPL-MCNC: 0.87 MG/DL
GLUCOSE SERPL-MCNC: 96 MG/DL
PARATHYROID HORMONE INTACT: 58 PG/ML
PHOSPHATE SERPL-MCNC: 3.4 MG/DL
POTASSIUM SERPL-SCNC: 4.5 MMOL/L
PROT SERPL-MCNC: 6.9 G/DL
SODIUM SERPL-SCNC: 141 MMOL/L

## 2021-01-25 ENCOUNTER — APPOINTMENT (OUTPATIENT)
Dept: ENDOCRINOLOGY | Facility: CLINIC | Age: 71
End: 2021-01-25
Payer: COMMERCIAL

## 2021-01-25 VITALS
SYSTOLIC BLOOD PRESSURE: 183 MMHG | BODY MASS INDEX: 36.49 KG/M2 | DIASTOLIC BLOOD PRESSURE: 98 MMHG | HEART RATE: 69 BPM | WEIGHT: 206 LBS

## 2021-01-25 DIAGNOSIS — E04.1 NONTOXIC SINGLE THYROID NODULE: ICD-10-CM

## 2021-01-25 PROCEDURE — 99072 ADDL SUPL MATRL&STAF TM PHE: CPT

## 2021-01-25 PROCEDURE — 99214 OFFICE O/P EST MOD 30 MIN: CPT

## 2021-01-25 RX ORDER — GINGER ROOT/GINGER ROOT EXT 262.5 MG
600-800 CAPSULE ORAL
Qty: 180 | Refills: 2 | Status: ACTIVE | COMMUNITY
Start: 2021-01-25 | End: 1900-01-01

## 2021-01-27 PROBLEM — E04.1 THYROID NODULE: Status: ACTIVE | Noted: 2020-12-10

## 2021-01-27 NOTE — END OF VISIT
[Time Spent: ___ minutes] : I have spent [unfilled] minutes of time on the encounter. [FreeTextEntry3] : All medical record entries made by the Scribe were at my, Dr. Tucker Gross, direction and personally dictated by me on 01/25/2021. I have reviewed the chart and agree that the record accurately reflects my personal performance of the history, physical exam, assessment and plan. I have also personally directed, reviewed and agreed with the chart.

## 2021-01-27 NOTE — ASSESSMENT
[FreeTextEntry1] : 72 y/o F pt with: \par \par 1. Primary Hyperparathyroidism:\par Pt had parathyroidectomy on 1/8/21, L Upper 1.6 grams adenoma. She does not have any symptoms of hypocalcemia. Her BMD from 8/2019 shows Osteopenia, recommend pt increase physical activities and take Ca and Vit D supplements. Will monitor BMD. \par \par 2. Thyroid Nodules:\par Pt has hx of Thyroid surgery in 1996 for thyroid nodule removal. \par FNA Biopsy on 12/16/20 shows:\par 1) R Lower Lobe: [place] III, Thyroseq negative. \par 2) Left lobe posterior FNA [place] II\par Monitor thyroid size growth\par \par Return in: 2 months

## 2021-01-27 NOTE — ADDENDUM
[FreeTextEntry1] : I, Cooper Rose, acted solely as a scribe for Dr. Tucker Gross on this date. 01/25/2021.

## 2021-01-27 NOTE — PHYSICAL EXAM
[Alert] : alert [Normal Outer Ear/Nose] : the ears and nose were normal in appearance [Normal Sclera/Conjunctiva] : normal sclera/conjunctiva [Well Healed Scar] : well healed scar [Clear to Auscultation] : lungs were clear to auscultation bilaterally [Normal S1, S2] : normal S1 and S2 [Normal Rate] : heart rate was normal [Pedal Pulses Normal] : the pedal pulses are present [Normal Bowel Sounds] : normal bowel sounds [Spine Straight] : spine straight [Normal Gait] : normal gait [No Rash] : no rash [Normal Reflexes] : deep tendon reflexes were 2+ and symmetric [No Tremors] : no tremors [Oriented x3] : oriented to person, place, and time [de-identified] : L side with mild nodularities  [de-identified] : varicose veins in lower extremities  [de-identified] : no hand tremors

## 2021-01-27 NOTE — HISTORY OF PRESENT ILLNESS
[FreeTextEntry1] : 72 y/o F pt, with s/p Parathyroidectomy (1/8/21) with Pathology: Left Upper Parathyroid Adenoma 1.6 grams (on 1/14/21). \par Hx of thyroid surgery to remove a large nodule ([place] [facility] and "received CARR ablation in 2018". \par Other PMHx: Osteopenia, HLD, HTN, obesity, LE varicose veins\par Denies Hx of bone fx and kidney stones. \par PSHx: knee replacement (2011) \par FHx: DM (uncle), thyroid disease (sister) \par SHx: Non smoker. No EtOH use. Works as an  and has 3 children\par \par 7/21/20 \par Pt states she had thyroid surgery to remove a large nodule (in 1996 [facility] Pt notes after surgery she did not have any complaints and was not given any specific treatments. In 2018, she started to become symptomatic with c/o leg swelling, tiredness, palpitations, and weight loss of ~15 - 20lbs. She states she went for endo evaluation, who ordered Thyroid US and pt notes she received CARR ablation in 2018. Pt states she felt better after CARR ablation. Pt reports in 2019 a Thyroid US was done. \par Today pt presents feeling well with c/o mild hair thinning. Denies palpitations, heat intolerance, and weight loss. \par \par 08/20/2020\par Pt presents today for thyroid f/u, feeling well with no acute complaints. She was reportedly informed ~1 year ago that her calcium level was high. \par Denies Hx of bone fx and kidney stones. \par \par 11/23/20 \par Her labs from initial visit: TSH 5.2, TSH rab negative, hypercalcemia, and increase iPTH (in 7/2020). Her thyroid US from 8/2020 shows enlarged R thyroid lobe and small L thyroid lobe. There is heterogenous nodule at R Lower Pole which measures 2 x 1.7 x 1.7 cm with no calcifications (this nodule is new). In the L lobe there is soft tissue nodule measures 2.1cm possible enlarged parathyroid gland.    \par \par Today pt presents for endocrine f/u, feeling well. Pt brought in BMD from 8/2019.\par Pt gained 3lbs since 8/2020. \par \par 1/25/21\par Today pt presents for thyroid f/u, feeling well with no major physical complaints. \par \par Current Medications: None (pt reports she has never been on any thyroid medications) \par \par Labs:\par - 1/12/21 iPTH 58 (iPTH 159 on 8/2020), Ca 9.4 (Ca 11.1 on 8/2020), s. creat 0.87, Phosphorous 3.4, Vit D 25- OH 22.0 \par - 12/21/20 Antithyroglobulin ab 31 (<40), TSH 6.630, Free T4 Index 2.1 (1.5- 3.8), Free T4 1.13 (0.80- 1.73), Total T3 98, T4 6.7, T3 Uptake 30.9, Ca 10.1, iPTH 138.6, s. creat 0.73, Vit D 25- OH 19, Vit D 1- 25- ,  \par - 12/16/20 FNA Biopsy: \par 1) R Lobe Lower Pole Thyroid: [place] III (Thyroseq: R Lower Thyroid 2.0cm Nodule ([place] III): Negative but low thyroid cell content).\par 2) L Lobe Posterior Pole Thyroid: Proliferating Parathyroid Cells. \par - 8/20/20 iPTH 159, Ca 11.1, Vit D 25- OH 21.2, Vit D 1 - 25- OH 81.1, s. creat 0.76, \par - 8/7/20 Thyroid US compared to study on 8/14/17: Enlarged R lobe with 4 nodules: Nodule 1: Stable anterior R upper pole well-circumscribed echogenic nodule measuring 0.9 x 0.5 x 0.6 cm with no calcifications. Nodule 2: Stable R upper pole well-circumscribed heterogenous nodules measuring 1.4 x 1.2 x 1.1 cm with thick linear calcifications. Nodule 3: Stable R lower pole thick-walled cystic nodule measuring 0.7 x 0.6 x 0.7 cm. Nodule 4: R lower pole well-circumscribed heterogenous nodule measuring 2 x 1.7 x 1.7 cm, not previously demonstrated. Small L lobe with hypervascularity and heterogenous parenchyma containing 3 nodules. Nodule 1: Stable L upper pole well-circumscribed cystic soft tissue nodule measuring 0.8 x 0.8 x 0.7 cm with questionable foci of calcification. Nodule 2: Stable L midpole well-circumscribed heterogenous nodule measuring 0.5 x 0.5 x 0.5 cm with no calcification. Nodule 3: Posterior to the L midpole ovoid hypoechoic soft tissue nodule measuring 2.1 x 1.1 x 1.1 cm with mild peripheral vascularity but no significant change. Possibly an enlarged parathyroid gland/adenoma.  \par - 7/22/20: TSH 5.22 (H), TSH Rab neg, Free T4 1.3, Free T3 2.77, Ca 11.8 (H)\par - 8/29/19 BMD: Lumber Spine L1- L3 T- Score: -2.4, L Femoral Neck T- Score: -1.3, L Total Hip T- Score: -1.5, L Radius 1/3 Site T- Score: -0.6. \par Impression: Pt has low bone mass (osteopenia).

## 2021-02-23 ENCOUNTER — APPOINTMENT (OUTPATIENT)
Dept: OTOLARYNGOLOGY | Facility: CLINIC | Age: 71
End: 2021-02-23
Payer: COMMERCIAL

## 2021-02-23 VITALS
HEART RATE: 70 BPM | TEMPERATURE: 97.3 F | OXYGEN SATURATION: 98 % | SYSTOLIC BLOOD PRESSURE: 156 MMHG | DIASTOLIC BLOOD PRESSURE: 92 MMHG

## 2021-02-23 DIAGNOSIS — E89.2 POSTPROCEDURAL HYPOPARATHYROIDISM: ICD-10-CM

## 2021-02-23 DIAGNOSIS — D35.1 BENIGN NEOPLASM OF PARATHYROID GLAND: ICD-10-CM

## 2021-02-23 DIAGNOSIS — D44.0 NEOPLASM OF UNCERTAIN BEHAVIOR OF THYROID GLAND: ICD-10-CM

## 2021-02-23 PROCEDURE — 99024 POSTOP FOLLOW-UP VISIT: CPT

## 2021-02-23 NOTE — REASON FOR VISIT
[FreeTextEntry2] : a second post op visit after parathyroidectomy [FreeTextEntry1] : Referred by Tucker Gross MD Endocrinologist,  PCP is Arnoldo He MD [facility], [place]

## 2021-02-23 NOTE — CONSULT LETTER
[Dear  ___] : Dear  [unfilled], [Consult Letter:] : I had the pleasure of evaluating your patient, [unfilled]. [Please see my note below.] : Please see my note below. [Consult Closing:] : Thank you very much for allowing me to participate in the care of this patient.  If you have any questions, please do not hesitate to contact me. [Sincerely,] : Sincerely, [FreeTextEntry3] : \par Prabhakar Corley M.D., FACS, ECNU\par Director Center for Thyroid & Parathyroid Surgery\par [facility]\par Certified in Thyroid/Parathyroid/Neck Ultrasound, ECNU/ AIUM\par \par , Department of Otolaryngology\par [facility] at [facility]\par

## 2021-02-23 NOTE — HISTORY OF PRESENT ILLNESS
[de-identified] : Lynn is a 70-year-old female who reports that she was treated with CARR for management of hyperthyroidism in 2018 after a subtotal thyroide[place] She was first noted to have hypercalcemia  and subsequent w/u is now consistent with primary hyperparathyroidism.  Her last serum calcium/PTH was 11.1/159. In August her calcium was as high as 11.8 mg/dl.  Her GFR is normal at 79. Vitamin D 25-OH total was low at 21.2.  She had subclinical hypothyroidism this summer with a TSH elevated to 5.22 with a normal free T4 at 1.3.  A thyroid ultrasound also this past summer revealed a small left lobe remnant and an enlarged right lobe measuring 5.6 x 2.2 x 1.9 cm.  There are three nodules in the right lobe.  There is an upper pole well circumscribed heterogeneous nodule measuring up to 9 mm without calcifications, another right upper pole well circumscribed heterogeneous nodule measuring up to 1.4 CM with linear calcifications in the right lower pole thick walled cystic nodule measuring 7 mm. A new right lower pole also prescribed heterogeneous nodule measures 2 cm without calcifications.  There is an 8 mm left upper pole cystic nodule measuring 8 mm.  A left mid pole well circumscribed heterogeneous nodule measures 5 mm calcifications. Posterior to the left mid lobe is an ovoid hypoechoic soft tissue nodule measuring 2.1 x 1.1 x 1.1 CM. Other than the new right lower pole 2 CM nodule that does not have any suspicious characteristics for malignancy exam was stable when compared to 2017. She is not currently taking any thyroid hormone replacement. Lynn denies recent shortness of breath, voice changes (although voice is always hoarse), dysphagia, anterior neck pain, neck pressure or mass. There is no family history of thyroid cancer. She denies any known radiation exposures in her youth.  She denies calcium, vitamin D supplements or past use of Lithium Carbonate.  However, she started HCTZ ~ 10 months ago.  She stated that her hypercalcemia was noted prior to taking HCTZ.  There is no family history of nephrolithiasis or renal disease.  There is no history of fragility bone fractures.  Other than intermittent constipation, polyuria/ polydipsia, she denies muscle weakness, fatigue, generalized bone aches, joint pain, depression, memory loss, brain fog, nausea, vomiting, abdominal pain, nephrolithiasis, peptic ulcer disease, pancreatitis or GERD. She denies fever, body aches, cough, cyanosis, chest burning, anosmia or recent known COVID exposures.  All family members at home are well. \par  [FreeTextEntry1] : Lynn returns for a second post op visit after an uncomplicated parathyroidectomy on 01/08/2021.  She had a left superior 2.5 cm parathyroid adenoma and a normal appearing left inferior PG.  IOPTH dropped from 206 to 61.8 with further decline and was felt satisfactory.  Her hospital discharge calcium was normal.  She denies paresthesias and is taking 1 Citracal daily.  Voice is less hoarse. Surgical pathology is consistent with a 2.8 cm, 1.6 g parathyroid adenoma.  Preop a right 2 cm lower thyroid lobe cystic nodule was biopsied and reported as [place] cat III but on molecular testing low probability of malignancy, however, low thyroid cell content reduces reliability of the mutation analysis. This will  need to be observed with serial US imaging.  If further concerns will need repeat FNAB.  Post op labs last month: Ca 9.4, PTH 58, vit D 22.  Patient advised to start vit D3 3,000 IU daily.   The neck incision is healing well.

## 2021-07-26 ENCOUNTER — APPOINTMENT (OUTPATIENT)
Dept: ENDOCRINOLOGY | Facility: CLINIC | Age: 71
End: 2021-07-26
Payer: COMMERCIAL

## 2021-07-26 VITALS
HEIGHT: 63 IN | BODY MASS INDEX: 35.79 KG/M2 | HEART RATE: 85 BPM | WEIGHT: 202 LBS | SYSTOLIC BLOOD PRESSURE: 143 MMHG | DIASTOLIC BLOOD PRESSURE: 85 MMHG

## 2021-07-26 DIAGNOSIS — Z78.0 ASYMPTOMATIC MENOPAUSAL STATE: ICD-10-CM

## 2021-07-26 DIAGNOSIS — E21.0 PRIMARY HYPERPARATHYROIDISM: ICD-10-CM

## 2021-07-26 DIAGNOSIS — E04.2 NONTOXIC MULTINODULAR GOITER: ICD-10-CM

## 2021-07-26 PROCEDURE — 99215 OFFICE O/P EST HI 40 MIN: CPT

## 2021-07-28 NOTE — ASSESSMENT
[FreeTextEntry1] : 69 y/o F pt with: \par \par 1. Thyroid surgery in 1996 for thyroid nodule removal:\par 8/7/20, thyroid u.s: b/l thyroid nodules, however, these nodules are not suspicious for malignancy. \par Plan to assess thyroid growth rate in 1 yr. \par \par 2.Pt has hx of CARR ablation in 2018, no records available\par .\par 3. Hypercalcemia, primary hyperparathyroidism:\par BMD  L1-L3 , T score - 2.4\par An US from 8/2020 reveals posterior to the L midpole ovoid hypoechoic soft tissue nodule measuring 2.1 cm possibly an enlarged parathyroid adenoma. \par Recommend ENT evaluation for evaluation and surgery.\par \par Return in: 2 months
93

## 2021-07-31 PROBLEM — E04.2 MULTIPLE THYROID NODULES: Status: ACTIVE | Noted: 2020-07-21

## 2021-07-31 NOTE — PHYSICAL EXAM
[Alert] : alert [Normal Sclera/Conjunctiva] : normal sclera/conjunctiva [Normal Outer Ear/Nose] : the ears and nose were normal in appearance [Clear to Auscultation] : lungs were clear to auscultation bilaterally [Normal S1, S2] : normal S1 and S2 [Normal Rate] : heart rate was normal [Pedal Pulses Normal] : the pedal pulses are present [Normal Bowel Sounds] : normal bowel sounds [Spine Straight] : spine straight [Normal Gait] : normal gait [No Rash] : no rash [Normal Reflexes] : deep tendon reflexes were 2+ and symmetric [No Tremors] : no tremors [Oriented x3] : oriented to person, place, and time [No Neck Mass] : no neck mass was observed [Thyroid Not Enlarged] : the thyroid was not enlarged [No Thyroid Nodules] : no palpable thyroid nodules [de-identified] : no tenderness. [de-identified] : varicose veins in lower extremities  [de-identified] : no hand tremors

## 2021-07-31 NOTE — HISTORY OF PRESENT ILLNESS
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 \par Hx of thyroid surgery to remove a large nodule (in 1996 at [facility] and "received CARR ablation in 2018". \par Other PMHx: osteoarthritis, osteopenia, HLD, HTN, obesity, LE varicose veins\par Denies Hx of bone fx and kidney stones. \par PSHx: knee replacement (2011) \par FHx: DM (uncle), thyroid disease (sister) \par Denies FHx of bone fx. Pt's father passed away and mother is 90 y/o. \par SHx: Non smoker. No EtOH use. Works as an  and has 3 children\par \par 7/21/20 \par Pt states she had thyroid surgery to remove a large nodule (in 1996 at the [facility] Pt notes after surgery she did not have any complaints and was not given any specific treatments. In 2018, she started to become symptomatic with c/o leg swelling, tiredness, palpitations, and weight loss of ~15 - 20lbs. She states she went for endo evaluation, who ordered Thyroid US and pt notes she received CARR ablation in 2018. Pt states she felt better after CARR ablation. Pt reports in 2019 a Thyroid US was done. \par Today pt presents feeling well with c/o mild hair thinning. Denies palpitations, heat intolerance, and weight loss. \par \par 08/20/2020\par Pt presents today for thyroid f/u, feeling well with no acute complaints. She was reportedly informed ~1 year ago that her calcium level was high. \par Denies Hx of bone fx and kidney stones. \par \par 11/23/20 \par Her labs from initial visit: TSH 5.2, TSH rab negative, hypercalcemia, and increase iPTH (in 7/2020). Her thyroid US from 8/2020 shows enlarged R thyroid lobe and small L thyroid lobe. There is heterogenous nodule at R Lower Pole which measures 2 x 1.7 x 1.7 cm with no calcifications (this nodule is new). In the L lobe there is soft tissue nodule measures 2.1cm possible enlarged parathyroid gland.    \par \par Today pt presents for endocrine f/u, feeling well. Pt brought in BMD from 8/2019.\par Pt gained 3lbs since 8/2020. \par \par 1/25/21\par Today pt presents for thyroid f/u, feeling well with no major physical complaints. \par \par 7/26/21\par Today pt presents for thyroid f/u, feeling well with no physical complaints. She has lost 4 lb since her last visit. No speech, swallowing or breathing difficulties. \par Pt is taking BP medication and regularly sees her PCP. \par Denies PMHx and FHx of bone fx. Pt's father passed away and mother is 90 y/o. \par  \par Current Medications: None (pt reports she has never been on any thyroid medications) \par \par Labs:\par - 1/12/21 iPTH 58 (iPTH 159 on 8/2020), Ca 9.4 (Ca 11.1 on 8/2020), s. creat 0.87, Phosphorous 3.4, Vit D 25- OH 22.0 \par - 12/21/20 Antithyroglobulin ab 31 (<40), TSH 6.630, Free T4 Index 2.1 (1.5- 3.8), Free T4 1.13 (0.80- 1.73), Total T3 98, T4 6.7, T3 Uptake 30.9, Ca 10.1, iPTH 138.6, s. creat 0.73, Vit D 25- OH 19, Vit D 1- 25- ,  \par - 12/16/20 FNA Biopsy: \par 1) R Lobe Lower Pole Thyroid: [place] III (Thyroseq: R Lower Thyroid 2.0cm Nodule ([place] III): Negative but low thyroid cell content).\par 2) L Lobe Posterior Pole Thyroid: Proliferating Parathyroid Cells. \par - 8/20/20 iPTH 159, Ca 11.1, Vit D 25- OH 21.2, Vit D 1 - 25- OH 81.1, s. creat 0.76, \par - 8/7/20 Thyroid US compared to study on 8/14/17: Enlarged R lobe with 4 nodules: Nodule 1: Stable anterior R upper pole well-circumscribed echogenic nodule measuring 0.9 x 0.5 x 0.6 cm with no calcifications. Nodule 2: Stable R upper pole well-circumscribed heterogenous nodules measuring 1.4 x 1.2 x 1.1 cm with thick linear calcifications. Nodule 3: Stable R lower pole thick-walled cystic nodule measuring 0.7 x 0.6 x 0.7 cm. Nodule 4: R lower pole well-circumscribed heterogenous nodule measuring 2 x 1.7 x 1.7 cm, not previously demonstrated. Small L lobe with hypervascularity and heterogenous parenchyma containing 3 nodules. Nodule 1: Stable L upper pole well-circumscribed cystic soft tissue nodule measuring 0.8 x 0.8 x 0.7 cm with questionable foci of calcification. Nodule 2: Stable L midpole well-circumscribed heterogenous nodule measuring 0.5 x 0.5 x 0.5 cm with no calcification. Nodule 3: Posterior to the L midpole ovoid hypoechoic soft tissue nodule measuring 2.1 x 1.1 x 1.1 cm with mild peripheral vascularity but no significant change. Possibly an enlarged parathyroid gland/adenoma.  \par - 7/22/20: TSH 5.22 (H), TSH Rab neg, Free T4 1.3, Free T3 2.77, Ca 11.8 (H)\par - 8/29/19 BMD: Lumber Spine L1- L3 T- Score: -2.4, L Femoral Neck T- Score: -1.3, L Total Hip T- Score: -1.5, L Radius 1/3 Site T- Score: -0.6. \par Impression: Pt has low bone mass (osteopenia).

## 2021-07-31 NOTE — ASSESSMENT
[FreeTextEntry1] : 70 y/o F pt with: \par \par 1. Primary Hyperparathyroidism:\par Had parathyroidectomy on 1/8/21. \par She remains asymptomatic. She is on Vit D and Ca. \par Currently, she is on thiazide. Considering changing this for another antihypertensive medication. \par \par 2. Thyroid Nodules:\par FNA biopsy from 12/2020: R Lower Lobe Afirma Test Thyroseq Benign. \par Order thyroid US for 01/2022. \par Pt denies upper respiratory obstructive symptoms. \par \par 3. Post-menopausal \par BMD from 2019 reveals osteopenia. Pt is at increased risk for fragility fracture. \par Continue Vit D and Ca as well as aerobic and resistance exercise. \par \par Return in: 01/2022

## 2021-07-31 NOTE — ADDENDUM
[FreeTextEntry1] : I, Berny Dorman, acted solely as a scribe for Dr. Tucker Gross on this date. 07/26/2021.\par

## 2021-07-31 NOTE — END OF VISIT
[FreeTextEntry3] : All medical record entries made by the Scribe were at my, Dr. Tucker Gross, direction and personally dictated by me on 07/26/2021. I have reviewed the chart and agree that the record accurately reflects my personal performance of the history, physical exam, assessment and plan. I have also personally directed, reviewed and agreed with the chart.  [Time Spent: ___ minutes] : I have spent [unfilled] minutes of time on the encounter.

## 2021-09-11 LAB
25(OH)D3 SERPL-MCNC: 38 NG/ML
ALBUMIN SERPL ELPH-MCNC: 4.5 G/DL
ALP BLD-CCNC: 98 U/L
ALT SERPL-CCNC: 9 U/L
ANION GAP SERPL CALC-SCNC: 10 MMOL/L
AST SERPL-CCNC: 15 U/L
BILIRUB SERPL-MCNC: 0.5 MG/DL
BUN SERPL-MCNC: 16 MG/DL
CALCIUM SERPL-MCNC: 9.4 MG/DL
CHLORIDE SERPL-SCNC: 102 MMOL/L
CO2 SERPL-SCNC: 26 MMOL/L
CREAT SERPL-MCNC: 0.73 MG/DL
GLUCOSE SERPL-MCNC: 102 MG/DL
POTASSIUM SERPL-SCNC: 4.3 MMOL/L
PROT SERPL-MCNC: 7.3 G/DL
SODIUM SERPL-SCNC: 138 MMOL/L

## 2021-10-04 ENCOUNTER — APPOINTMENT (OUTPATIENT)
Dept: OTOLARYNGOLOGY | Facility: CLINIC | Age: 71
End: 2021-10-04